# Patient Record
Sex: MALE
[De-identification: names, ages, dates, MRNs, and addresses within clinical notes are randomized per-mention and may not be internally consistent; named-entity substitution may affect disease eponyms.]

---

## 2018-01-09 ENCOUNTER — APPOINTMENT (OUTPATIENT)
Dept: NEUROLOGY | Facility: CLINIC | Age: 18
End: 2018-01-09
Payer: COMMERCIAL

## 2018-01-09 VITALS
HEIGHT: 73 IN | SYSTOLIC BLOOD PRESSURE: 124 MMHG | WEIGHT: 170 LBS | DIASTOLIC BLOOD PRESSURE: 70 MMHG | BODY MASS INDEX: 22.53 KG/M2

## 2018-01-09 DIAGNOSIS — S06.0X0A CONCUSSION W/OUT LOSS OF CONSCIOUSNESS, INITIAL ENCOUNTER: ICD-10-CM

## 2018-01-09 DIAGNOSIS — Q79.6 EHLERS-DANLOS SYNDROME: ICD-10-CM

## 2018-01-09 PROCEDURE — 99204 OFFICE O/P NEW MOD 45 MIN: CPT

## 2019-01-14 ENCOUNTER — OTHER (OUTPATIENT)
Age: 19
End: 2019-01-14

## 2019-01-14 ENCOUNTER — APPOINTMENT (OUTPATIENT)
Dept: ORTHOPEDIC SURGERY | Facility: CLINIC | Age: 19
End: 2019-01-14
Payer: COMMERCIAL

## 2019-01-14 VITALS
HEART RATE: 62 BPM | SYSTOLIC BLOOD PRESSURE: 122 MMHG | DIASTOLIC BLOOD PRESSURE: 69 MMHG | HEIGHT: 73 IN | BODY MASS INDEX: 22.66 KG/M2 | WEIGHT: 171 LBS

## 2019-01-14 DIAGNOSIS — M21.42 FLAT FOOT [PES PLANUS] (ACQUIRED), RIGHT FOOT: ICD-10-CM

## 2019-01-14 DIAGNOSIS — M25.569 PAIN IN UNSPECIFIED KNEE: ICD-10-CM

## 2019-01-14 DIAGNOSIS — M24.9 JOINT DERANGEMENT, UNSPECIFIED: ICD-10-CM

## 2019-01-14 DIAGNOSIS — M23.92 UNSPECIFIED INTERNAL DERANGEMENT OF LEFT KNEE: ICD-10-CM

## 2019-01-14 DIAGNOSIS — M21.41 FLAT FOOT [PES PLANUS] (ACQUIRED), RIGHT FOOT: ICD-10-CM

## 2019-01-14 DIAGNOSIS — Z78.9 OTHER SPECIFIED HEALTH STATUS: ICD-10-CM

## 2019-01-14 PROCEDURE — 73564 X-RAY EXAM KNEE 4 OR MORE: CPT | Mod: LT

## 2019-01-14 PROCEDURE — 99204 OFFICE O/P NEW MOD 45 MIN: CPT

## 2019-01-14 NOTE — DISCUSSION/SUMMARY
[de-identified] : The patient is a 18 year old male with a possible ACL tear of the left knee. He was sent for an MRI of the knee. He will follow up with the results of the MRI.

## 2019-01-14 NOTE — ADDENDUM
[FreeTextEntry1] : This note was authored by Ousmane Joyce working as a medical scribe for Dr. Phuc العلي. The note was reviewed, edited, and revised by Dr. Phuc العلي whom is in agreement with the exam findings, imaging findings, and treatment plan. 01/14/2019.

## 2019-01-14 NOTE — CONSULT LETTER
[Dear  ___] : Dear  [unfilled], [I had the pleasure of evaluating your patient, [unfilled].] : I had the pleasure of evaluating your patient, [unfilled]. [Please see my note below.] : Please see my note below. [Sincerely,] : Sincerely, [FreeTextEntry2] : Catherine Morales MD [FreeTextEntry3] : Phuc العلي MD\par [facility]\par [facility]

## 2019-01-14 NOTE — HISTORY OF PRESENT ILLNESS
[All Other ROS Normal] : All other review of systems are negative except as noted [Joint Pain] : joint pain [FreeTextEntry1] : Left knee pain. 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 Early in October he reports exiting a car and when to stretch his leg. He noted a painful popping sensation along the anteromedial aspect of the knee. He had difficulty weightbearing. He did not seek medical attention at that time. He rested. He is able to ambulate a little surfaces without difficulty. Since that time he's been unable to return to all activities including basketball, football and playing volleyball. He reports sharp pain along the anteromedial joint line with torsional movements. He states that the knee does not "feel right" when attempting to jog. He denies mechanical symptoms of locking or giving way.

## 2019-01-14 NOTE — PHYSICAL EXAM
[FreeTextEntry2] : The patient appears well nourished  and in no apparent distress.  The patient is alert and oriented to person, place, and time.   Affect and mood appear normal.    The head is normocephalic and atraumatic.  The eyes reveal normal sclera and extra ocular muscles are intact. The tongue is midline with no apparent lesions.  Skin shows normal turgor with no evidence of eczema or psoriasis.  No respiratory distress noted.  Sensation grossly intact.\par \par Exam of the left knee shows laxity of the MCL, hyper extension of 20 degrees which is symmetric, negative anterior drawer, mild laxity of the anterior drawer compared to the right knee. 5/5 motor strength bilaterally distally. Sensation intact distally.  [de-identified] : Xray- 4 views of the left knee shows well preserved joint spaces of the left knee.

## 2019-01-28 ENCOUNTER — OTHER (OUTPATIENT)
Age: 19
End: 2019-01-28

## 2021-04-02 ENCOUNTER — EMERGENCY (EMERGENCY)
Facility: HOSPITAL | Age: 21
LOS: 1 days | Discharge: HOME | End: 2021-04-02
Payer: COMMERCIAL

## 2021-04-02 VITALS
HEIGHT: 74 IN | WEIGHT: 184.09 LBS | HEART RATE: 87 BPM | DIASTOLIC BLOOD PRESSURE: 74 MMHG | RESPIRATION RATE: 20 BRPM | SYSTOLIC BLOOD PRESSURE: 148 MMHG | OXYGEN SATURATION: 99 % | TEMPERATURE: 99 F

## 2021-04-02 LAB — SARS-COV-2 RNA SPEC QL NAA+PROBE: DETECTED

## 2021-04-02 PROCEDURE — 99284 EMERGENCY DEPT VISIT MOD MDM: CPT

## 2021-04-02 PROCEDURE — U0005: CPT

## 2021-04-02 PROCEDURE — U0003: CPT

## 2021-04-02 PROCEDURE — 99283 EMERGENCY DEPT VISIT LOW MDM: CPT

## 2021-04-02 NOTE — ED PROVIDER NOTE - OBJECTIVE STATEMENT
This is a 20 year old male here for covid testing, reports "low grade temp x 1 day, could have possibly been exposed works at [employer], wants to make sure doesn't have covid before [facility]."

## 2021-04-02 NOTE — ED PROVIDER NOTE - PATIENT PORTAL LINK FT
You can access the FollowMyHealth Patient Portal offered by [facility] by registering at the following website: [email or web address]. By joining [employer]’s FollowMyHealth portal, you will also be able to view your health information using other applications (apps) compatible with our system.

## 2021-04-02 NOTE — ED PROVIDER NOTE - PHYSICAL EXAMINATION
Constitutional - well-developed; well nourished. Head - NCAT. Airway patent.  Neuro - A&Ox3. normal gait.